# Patient Record
Sex: FEMALE | Race: OTHER | Employment: FULL TIME | URBAN - METROPOLITAN AREA
[De-identification: names, ages, dates, MRNs, and addresses within clinical notes are randomized per-mention and may not be internally consistent; named-entity substitution may affect disease eponyms.]

---

## 2017-01-18 PROBLEM — Z98.890 H/O LEEP: Status: ACTIVE | Noted: 2017-01-18

## 2017-05-09 PROBLEM — R11.0 NAUSEA: Status: ACTIVE | Noted: 2017-05-09

## 2017-05-12 PROBLEM — K80.20 GALLSTONES: Status: ACTIVE | Noted: 2017-05-12

## 2017-07-10 PROBLEM — K80.10 CALCULUS OF GALLBLADDER WITH CHRONIC CHOLECYSTITIS WITHOUT OBSTRUCTION: Status: ACTIVE | Noted: 2017-05-12

## 2017-11-27 PROBLEM — Z90.49 HX OF CHOLECYSTECTOMY: Status: ACTIVE | Noted: 2017-05-12

## 2019-10-07 PROBLEM — Z98.890 H/O DILATION AND CURETTAGE: Status: ACTIVE | Noted: 2019-10-07

## 2021-08-19 PROBLEM — R11.0 NAUSEA: Status: RESOLVED | Noted: 2017-05-09 | Resolved: 2021-08-19

## 2022-03-09 PROBLEM — R63.6 UNDERWEIGHT: Status: ACTIVE | Noted: 2022-03-09

## 2022-11-14 ENCOUNTER — HOSPITAL ENCOUNTER (OUTPATIENT)
Dept: PHYSICAL THERAPY | Age: 37
Discharge: HOME | End: 2022-11-14

## 2022-11-14 PROCEDURE — 9990000029 HC GAP PACKAGE: Performed by: SPECIALIST/TECHNOLOGIST

## 2022-11-14 NOTE — FLOWSHEET NOTE
[facility], [address] [place]  Phone: [phone]  Fax [phone]      Date:  2022    Patient Name:  Katherin Ramos    :  1985  MRN: [id number]  Restrictions/Precautions:    Medical/Treatment Diagnosis Information:   LBP     Physician Information:   none    Patient is Post-Op [] Yes   [x] No     DOS:      Padi $210: 22         Subjective Has had chronic LBP for awhile. 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 Scoliosis L spine.                     Goals Strengthen TA, multifidus,   Decrease back pain                   Reformer Exercises Squats 2R1B 2x10  Walking 2R 2x10  Raises 2R 2x10         Pelvic Stabilization   Bridges 2R1Y with ADD 2x10          Standing ABD 1R1Y 2x10                             Trunk Stabilization 1R 12x          TA series                             Hip Disassociation 2R1Y 15x          arcs                    Flossing 2R         Scapular Stabilization                                        Thoracic Mobility                                        General ROM Hamstring 1 min                                       Other                                        Summary/Comments                            Electronically signed by:  Mamie Alfaro, MS, LAT, ATC

## 2023-01-24 ENCOUNTER — HOSPITAL ENCOUNTER (OUTPATIENT)
Dept: PHYSICAL THERAPY | Age: 38
Discharge: HOME | End: 2023-01-24

## 2023-01-24 NOTE — FLOWSHEET NOTE
[facility], [address] [place]  Phone: [phone]  Fax [phone]      Date:  2023    Patient Name:  Nito Cochran    :  1985  MRN: [id number]  Restrictions/Precautions:    Medical/Treatment Diagnosis Information:   LBP     Physician Information:   none    Patient is Post-Op [] Yes   [x] No     DOS:      Padi $210: 22        Subjective Has had chronic LBP for awhile. 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 Scoliosis L spine.                     Goals Strengthen TA, multifidus,   Decrease back pain                   Reformer Exercises Squats 2R1B 2x10  Walking 2R 2x10  Raises 2R 2x10 25x wide ER  2x10  2x10  Single 2R1Y 2x10        Pelvic Stabilization   Bridges 2R1Y with ADD 2x10 2x10         Standing ABD 1R1Y 2x10 2x10                            Trunk Stabilization 1R 12x 12x         TA series                             Hip Disassociation 2R1Y 15x 15x         arcs                    Flossing 2R         Scapular Stabilization  Seated on box          Rows 1R1B          Ext 1R                  Thoracic Mobility                                        General ROM Hamstring 1 min 1 min          Post capsule 1 min          Hip flexor 1 min 1R                  Other                                        Summary/Comments                            Electronically signed by:  Evie aPz MS, LAT, ATC

## 2023-01-31 ENCOUNTER — HOSPITAL ENCOUNTER (OUTPATIENT)
Dept: PHYSICAL THERAPY | Age: 38
Discharge: HOME | End: 2023-01-31

## 2023-01-31 NOTE — FLOWSHEET NOTE
[facility] [facility], [address] [place]  Phone: [phone]  Fax [phone]      Date:  2023    Patient Name:  Tiffany Silva    :  1985  MRN: [id number]  Restrictions/Precautions:    Medical/Treatment Diagnosis Information:   LBP     Physician Information:   none    Patient is Post-Op [] Yes   [x] No     DOS:      Padi $210: 22       Subjective Has had chronic LBP for awhile. 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 Scoliosis L spine.                     Goals Strengthen TA, multifidus,   Decrease back pain                   Reformer Exercises Squats 2R1B 2x10  Walking 2R 2x10  Raises 2R 2x10 25x wide ER  2x10  2x10  Single 2R1Y 2x10 25x  2x10  12/12  25x       Pelvic Stabilization   Bridges 2R1Y with ADD 2x10 2x10 2x10        Standing ABD 1R1Y 2x10 2x10 2x10                           Trunk Stabilization 1R 12x 12x 12x        TA series            flex                 Hip Disassociation 2R1Y 15x 15x 15x        arcs                    Flossing 2R         Scapular Stabilization  Seated on box          Rows 1R1B 15x         Ext 1R 10x                 Thoracic Mobility                                        General ROM Hamstring 1 min 1 min 1 min         Post capsule 1 min 1 min         Hip flexor 1 min 1R 1 min                 Other   Plank 1 min                                     Summary/Comments                            Electronically signed by:  Amira Medel, MS, LAT, ATC

## 2023-02-07 ENCOUNTER — HOSPITAL ENCOUNTER (OUTPATIENT)
Dept: PHYSICAL THERAPY | Age: 38
Discharge: HOME | End: 2023-02-07

## 2023-02-07 NOTE — FLOWSHEET NOTE
[facility] [facility], [address] [place]  Phone: [phone]  Fax [phone]      Date:  2023    Patient Name:  Naima Sagastume    :  1985  MRN: [id number]  Restrictions/Precautions:    Medical/Treatment Diagnosis Information:   LBP     Physician Information:   none    Patient is Post-Op [] Yes   [x] No     DOS:      Padi $210: 22      Subjective Has had chronic LBP for awhile. 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 Scoliosis L spine.                     Goals Strengthen TA, multifidus,   Decrease back pain                   Reformer Exercises Squats 2R1B 2x10  Walking 2R 2x10  Raises 2R 2x10 25x wide ER  2x10  2x10  Single 2R1Y 2x10 25x  2x10  12/12  25x 25x  2x10  12/12  25x      Pelvic Stabilization   Bridges 2R1Y with ADD 2x10 2x10 2x10 2x10       Standing ABD 1R1Y 2x10 2x10 2x10 2x10                          Trunk Stabilization 1R 12x 12x 12x 15x       TA series   Flex series         flex                 Hip Disassociation 2R1Y 15x 15x 15x 15x       arcs                    Flossing 2R         Scapular Stabilization  Seated on box          Rows 1R1B 15x 15x        Ext 1R 10x 10x                Thoracic Mobility                                        General ROM Hamstring 1 min 1 min 1 min 1 min        Post capsule 1 min 1 min 1 min        Hip flexor 1 min 1R 1 min 1 min                Other   Plank 1 min 1 min                                    Summary/Comments                            Electronically signed by:  Luis Mota, MS, LAT, ATC

## 2023-05-11 ENCOUNTER — HOSPITAL ENCOUNTER (OUTPATIENT)
Dept: PHYSICAL THERAPY | Age: 38
Discharge: HOME | End: 2023-05-11

## 2023-05-11 NOTE — FLOWSHEET NOTE
[facility] [facility], [address] [facility] [place]  Phone: [phone]  Fax [phone]      Date:  2023    Patient Name:  Tonny Hernandez    :  1985  MRN: [id number]  Restrictions/Precautions:    Medical/Treatment Diagnosis Information:   LBP     Physician Information:   none    Patient is Post-Op [] Yes   [x] No     DOS:      Padi $210: 22     Subjective Has had chronic LBP for awhile. Doing well Doing well Doing well \"Back went out\" 3 weeks ago - saw MD and one vertebrae is shifted anteriorly. 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 Scoliosis L spine.                     Goals Strengthen TA, multifidus,   Decrease back pain                   Reformer Exercises Squats 2R1B 2x10  Walking 2R 2x10  Raises 2R 2x10 25x wide ER  2x10  2x10  Single 2R1Y 2x10 25x  2x10  12/12  25x 25x  2x10  12/12  25x 25x 3R  2R1Y  2R1Y  20x     Pelvic Stabilization   Bridges 2R1Y with ADD 2x10 2x10 2x10 2x10 2x10      Standing ABD 1R1Y 2x10 2x10 2x10 2x10 2x10                         Trunk Stabilization 1R 12x 12x 12x 15x 15x      TA series   Flex series No flex        flex                 Hip Disassociation 2R1Y 15x 15x 15x 15x 15x      arcs                    Flossing 2R         Scapular Stabilization  Seated on box          Rows 1R1B 15x 15x 15x       Ext 1R 10x 10x 10x               Thoracic Mobility                                        General ROM Hamstring 1 min 1 min 1 min 1 min 1 min       Post capsule 1 min 1 min 1 min 1 min       Hip flexor 1 min 1R 1 min 1 min 1 min               Other   Plank 1 min 1 min                                    Summary/Comments                            Electronically signed by:  Nida Waddell, MS, LAT, ATC

## 2023-05-19 ENCOUNTER — HOSPITAL ENCOUNTER (OUTPATIENT)
Dept: PHYSICAL THERAPY | Age: 38
Discharge: HOME | End: 2023-05-19

## 2023-05-19 NOTE — FLOWSHEET NOTE
[facility] [facility], [address] [place]  Phone: [phone]  Fax [phone]      Date:  2023    Patient Name:  Atiya Moses    :  1985  MRN: [id number]  Restrictions/Precautions:    Medical/Treatment Diagnosis Information:   LBP     Physician Information:   none    Patient is Post-Op [] Yes   [x] No     DOS:      Padi $210: 22    Subjective Has had chronic LBP for awhile. Doing well Doing well Doing well \"Back went out\" 3 weeks ago - saw MD and one vertebrae is shifted anteriorly. 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 Scoliosis L spine.                     Goals Strengthen TA, multifidus,   Decrease back pain                   Reformer Exercises Squats 2R1B 2x10  Walking 2R 2x10  Raises 2R 2x10 25x wide ER  2x10  2x10  Single 2R1Y 2x10 25x  2x10  12/12  25x 25x  2x10  12/12  25x 25x 3R  2R1Y  2R1Y  20x 25x  2x10  2x10  20x    Pelvic Stabilization   Bridges 2R1Y with ADD 2x10 2x10 2x10 2x10 2x10 2x10     Standing ABD 1R1Y 2x10 2x10 2x10 2x10 2x10 2x10                        Trunk Stabilization 1R 12x 12x 12x 15x 15x 15x     TA series   Flex series No flex Attempted flex - continue to wait       flex                 Hip Disassociation 2R1Y 15x 15x 15x 15x 15x 15x     arcs                    Flossing 2R         Scapular Stabilization  Seated on box          Rows 1R1B 15x 15x 15x 15x      Ext 1R 10x 10x 10x 10x              Thoracic Mobility                                        General ROM Hamstring 1 min 1 min 1 min 1 min 1 min 1 min      Post capsule 1 min 1 min 1 min 1 min 1 min      Hip flexor 1 min 1R 1 min 1 min 1 min 1 min              Other   Plank 1 min 1 min                                    Summary/Comments

## 2023-06-01 ENCOUNTER — HOSPITAL ENCOUNTER (OUTPATIENT)
Dept: PHYSICAL THERAPY | Age: 38
Discharge: HOME | End: 2023-06-01

## 2023-06-01 NOTE — FLOWSHEET NOTE
[facility], [address] [facility] [place]  Phone: [phone]  Fax [phone]      Date:  2023    Patient Name:  Danette Rios    :  1985  MRN: [id number]  Restrictions/Precautions:    Medical/Treatment Diagnosis Information:   LBP     Physician Information:   none    Patient is Post-Op [] Yes   [x] No     DOS:      Padi $210: 22   Subjective Has had chronic LBP for awhile. Doing well Doing well Doing well \"Back went out\" 3 weeks ago - saw MD and one vertebrae is shifted anteriorly. MD recommended TA strengthening Doing well Doing ok - back has been sore from being on feet and toddler wants to be held more. 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 Scoliosis L spine.                     Goals Strengthen TA, multifidus,   Decrease back pain                   Reformer Exercises Squats 2R1B 2x10  Walking 2R 2x10  Raises 2R 2x10 25x wide ER  2x10  2x10  Single 2R1Y 2x10 25x  2x10  12/12  25x 25x  2x10  12/12  25x 25x 3R  2R1Y  2R1Y  20x 25x  2x10  2x10  20x 25x  2x10  2x10  20x   Pelvic Stabilization   Bridges 2R1Y with ADD 2x10 2x10 2x10 2x10 2x10 2x10 2x10    Standing ABD 1R1Y 2x10 2x10 2x10 2x10 2x10 2x10 2x10                       Trunk Stabilization 1R 12x 12x 12x 15x 15x 15x 15x    TA series   Flex series No flex Attempted flex - continue to wait       flex                 Hip Disassociation 2R1Y 15x 15x 15x 15x 15x 15x 15x    arcs                    Flossing 2R         Scapular Stabilization  Seated on box          Rows 1R1B 15x 15x 15x 15x 15x     Ext 1R 10x 10x 10x 10x 10x             Thoracic Mobility                                        General ROM Hamstring 1 min 1 min 1 min 1 min 1 min 1 min 1 min     Post capsule 1 min 1 min 1 min 1 min 1 min 1 min     Hip

## 2023-07-18 ENCOUNTER — HOSPITAL ENCOUNTER (OUTPATIENT)
Dept: PHYSICAL THERAPY | Age: 38
Setting detail: THERAPIES SERIES
Discharge: HOME | End: 2023-07-18
Payer: COMMERCIAL

## 2023-07-18 PROCEDURE — 97161 PT EVAL LOW COMPLEX 20 MIN: CPT

## 2023-07-18 PROCEDURE — 97110 THERAPEUTIC EXERCISES: CPT

## 2023-07-18 PROCEDURE — 97530 THERAPEUTIC ACTIVITIES: CPT

## 2023-07-18 NOTE — FLOWSHEET NOTE
[facility], [facility]  [address] [facility], [address] [facility]  Phone: [phone]   Fax:     [phone]    Physical Therapy Treatment Note/ Progress Report:     Date:  2023    Patient Name:  Savanah Deshpande    :  1985  MRN: [id number]    Pertinent Medical History: Additional Pertinent Hx: Bipolar 1 disorder    Medical/Treatment Diagnosis Information:  Medical Diagnosis: Radiculopathy, lumbar region [M54.16]  Treatment Diagnosis: back pain, dec core and hip strength, dec ROM limiting ADLs    Insurance/Certification information:  PT Insurance Information: [employer] - 36 combined vpcy; no auth  Physician Information:  Qasim Taylor MD  Plan of care signed (Y/N): faxed at eval     Date of Patient follow up with Physician:      Progress Report: []  Yes  [x]  No     Date Range for reporting period:  Beginnin2023  Ending:    Progress report due (10 Rx/or 30 days whichever is less): 02     Recertification due (POC duration/ or 90 days whichever is less):     Visit # POC/ Insurance Allowable Auth Needed    40 []Yes    [x]No     Functional Outcomes Measure:     Test: Modified Oswestry   Date Assessed Score    6           Pain level:  4/10 ( up to 8/10 if back \"goes out\" like it does a few times a yr)     History of Injury:  Pt with 10+ yr h/o back pain resulting from gymnastics injury as a child. Pain was intermittent but has increased since this past April when it \"when out\". She has a 2 yr old child. Only a few episodes of R LE pain and NT but this doesn't happen regularly. Pt doesn't really do any hep consistently. Sitting is better than standing; stiffness in the morning but pain levels by end of day depends on activity levels. Xrays:   L5-S1 isthmic spondylolisthesis.        SUBJECTIVE:  See eval    OBJECTIVE:   Observation:   Test measurements:      RESTRICTIONS/PRECAUTIONS:

## 2023-07-18 NOTE — PLAN OF CARE
ROM, for LE, Glutes and core   [x]  NMR activation and proprioception for glutes , LE and Core   [x]  Manual therapy as indicated for Hip complex, LE and spine to include: Dry Needling/IASTM, STM, PROM, Gr I-IV mobilizations, manipulation. [x]  Modalities as needed that may include: thermal agents, E-stim, Biofeedback, US, iontophoresis as indicated  [x]  Patient education on joint protection, postural re-education, activity modification, progression of HEP. [x]  Aquatic exercise including: strength training, ROM and balance for LE, Glutes and core     HEP instruction: see flowsheet     Access Code: [email or web address]  URL: [email or web address]  Date: 07/18/2023  Prepared by: Abram Portillo    Exercises  - Supine Double Knee to Chest  - 1-2 x daily - 7 x weekly - 1 sets - 3 reps - 20 sec  hold  - Supine Piriformis Stretch with Foot on Ground  - 1-2 x daily - 7 x weekly - 1 sets - 3 reps - 20 sec hold  - Supine Posterior Pelvic Tilt  - 1-2 x daily - 7 x weekly - 1 sets - 10 reps - 5 sec  hold  - Supine Transversus Abdominis Bracing - Hands on Ground  - 1-2 x daily - 7 x weekly - 1 sets - 10 reps - 5 sec  hold    GOALS:  Patient stated goal: \"core strength\"  [] Progressing: [] Met: [] Not Met: [] Adjusted  Therapist goals for Patient:   Short Term Goals: To be achieved in: 2 weeks  1. Independent in HEP and progression per patient tolerance, in order to prevent re-injury. [] Progressing: [] Met: [] Not Met: [] Adjusted  2. Patient will have a decrease in pain to facilitate improvement in movement, function, and ADLs as indicated by Functional Deficits. [] Progressing: [] Met: [] Not Met: [] Adjusted    Long Term Goals: To be achieved in: at DC  1. Decrease Modified Oswestry functional outcome score from 6 to 3 to assist with reaching prior level of function. [] Progressing: [] Met: [] Not Met: [] Adjusted  2.  Patient will demonstrate increased AROM to WNL, good LS mobility, good hip ROM to allow for proper joint

## 2023-07-20 ENCOUNTER — APPOINTMENT (OUTPATIENT)
Dept: PHYSICAL THERAPY | Age: 38
End: 2023-07-20
Payer: COMMERCIAL

## 2023-07-25 ENCOUNTER — HOSPITAL ENCOUNTER (OUTPATIENT)
Dept: PHYSICAL THERAPY | Age: 38
Setting detail: THERAPIES SERIES
Discharge: HOME | End: 2023-07-25
Payer: COMMERCIAL

## 2023-07-25 PROCEDURE — 97110 THERAPEUTIC EXERCISES: CPT

## 2023-07-25 PROCEDURE — 97530 THERAPEUTIC ACTIVITIES: CPT

## 2023-07-25 NOTE — FLOWSHEET NOTE
functioning as indicated by patients Functional Deficits. [] Progressing: [] Met: [] Not Met: [] Adjusted  3. Patient will demonstrate an increase in Strength to good proximal hip and core activation to allow for proper functional mobility as indicated by patients Functional Deficits. [] Progressing: [] Met: [] Not Met: [] Adjusted  4. 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 [] Progressing: [] Met: [] Not Met: [] Adjusted  5. \"Normal function with less pain\"(patient specific functional goal)    [] Progressing: [] Met: [] Not Met: [] Adjusted            ASSESSMENT:  7/18: skilled therapy for core stabilization program to allow pt to tolerate normal ADLS such as work and  without pain    7/25: cues for posture and TA with all ex, seemed to sacha well without inc c/o     Treatment/Activity Tolerance:  [x] Patient tolerated treatment well [] Patient limited by fatique  [] Patient limited by pain  [] Patient limited by other medical complications  [] Other:     Overall Progression Towards Functional goals/ Treatment Progress Update:  [] Patient is progressing as expected towards functional goals listed. [] Progression is slowed due to complexities/Impairments listed. [] Progression has been slowed due to co-morbidities.   [x] Plan just implemented, too soon to assess goals progression <30days   [] Goals require adjustment due to lack of progress  [] Patient is not progressing as expected and requires additional follow up with physician  [] Other:    Prognosis for POC: [x] Good [] Fair  [] Poor    Patient requires continued skilled intervention: [x] Yes  [] No        PLAN: Add as tolerated   [x] Continue per plan of care [] Alter current plan (see comments)  [] Plan of care initiated [] Hold pending MD visit [] Discharge    Electronically signed by: Fili Gibson, PTMPT [id number]    Note: If patient does not return for scheduled/recommended follow

## 2023-07-27 ENCOUNTER — HOSPITAL ENCOUNTER (OUTPATIENT)
Dept: PHYSICAL THERAPY | Age: 38
Setting detail: THERAPIES SERIES
Discharge: HOME | End: 2023-07-27
Payer: COMMERCIAL

## 2023-07-27 PROCEDURE — 97110 THERAPEUTIC EXERCISES: CPT

## 2023-07-27 NOTE — FLOWSHEET NOTE
today; sacha last session well          OBJECTIVE:   Observation:   Test measurements:      RESTRICTIONS/PRECAUTIONS:     Exercises/Interventions:     Therapeutic Ex (59390)   Min:22 Resistance/Reps Notes/Cues   NuStep Seat 4 L2 x 5 min  See hep listed below     TB    Side step w/ TA   Paloff press w/ TA Grn  2 steps x 3 reps L/R  X 10 presses L/R    Swiss ball w/ TA   Sit opp UE/LE   Walk out into prtl bridge  Grn ball  X 10   X 3    Cues for control  SBA/CGA   Plyoball w/ TA   Amb cw/ccw   Overhead lift w/ step up   Diag lift  2#  Length of gym x 2   6\" x 10   X 5 L/R      Wall squat with TA 10 x 5 sec  Add lift overhead/plyoball        Bridge w/ LE hip abd/add w/ versa loop  Grn  X 10     Abdominal crunch   Diag crunch L1 x 10   add         Quad UE            LE Alternating x 5 each  Same leg L/R x 5 each    Progress to alternating LEs/bird dog   Plank  On toes 2 x 15 sec     Side plank           Therapeutic Activity (93574) Min: 5    Body mech edu w/ crate lifting and TA Floor - pivot to table x 5  Table to top of linen cart x 5                   NMR re-education (41798)   Min:3     TA with    LE march   Lower abd 90/90 hold   X 10   3 x 15 sec      Progress to bicycle legs/dead bug                  Manual Intervention (67279) Min:                Modalities  Min:             Other Therapeutic Activities:  Pt was educated on PT POC, Diagnosis, Prognosis, pathomechanics as well as frequency and duration of scheduling future physical therapy appointments. Time was also taken on this day to answer all patient questions and participation in PT. Reviewed appointment policy in detail with patient and patient verbalized understanding. Home Exercise Program: Patient instructed in the following for HEP: . Patient verbalized/demonstrated understanding and was issued written handout. Access Code: [email or web address]  URL: [email or web address]  Date: 07/18/2023  Prepared by: Sunny Abad     Exercises  - Supine Double

## 2023-08-01 ENCOUNTER — HOSPITAL ENCOUNTER (OUTPATIENT)
Dept: PHYSICAL THERAPY | Age: 38
Setting detail: THERAPIES SERIES
Discharge: HOME | End: 2023-08-01
Payer: COMMERCIAL

## 2023-08-01 PROCEDURE — 97110 THERAPEUTIC EXERCISES: CPT

## 2023-08-01 NOTE — FLOWSHEET NOTE
[address] [facility] Therapy, [facility]  [address] [place], [address] [facility]  Phone: [phone]   Fax:     [phone]    Physical Therapy Treatment Note/ Progress Report:     Date:  2023    Patient Name:  Savanah Deshpande    :  1985  MRN: [id number]    Pertinent Medical History: Additional Pertinent Hx: Bipolar 1 disorder    Medical/Treatment Diagnosis Information:  Medical Diagnosis: Radiculopathy, lumbar region [M54.16]  Treatment Diagnosis: back pain, dec core and hip strength, dec ROM limiting ADLs    Insurance/Certification information:  PT Insurance Information: [employer] - 36 combined vpcy; no auth  Physician Information:  Severo Locke, MD  Plan of care signed (Y/N): faxed at eval     Date of Patient follow up with Physician:      Progress Report: []  Yes  [x]  No     Date Range for reporting period:  Beginnin2023  Ending:    Progress report due (10 Rx/or 30 days whichever is less): 3/89/19     Recertification due (POC duration/ or 90 days whichever is less):     Visit # POC/ Insurance Allowable Auth Needed    40 []Yes    [x]No     Functional Outcomes Measure:     Test: Modified Oswestry   Date Assessed Score    6           Pain level:  1-2/10 ( up to 8/10 if back \"goes out\" like it does a few times a yr)     History of Injury:  Pt with 10+ yr h/o back pain resulting from gymnastics injury as a child. Pain was intermittent but has increased since this past April when it \"when out\". She has a 2 yr old child. Only a few episodes of R LE pain and NT but this doesn't happen regularly. Pt doesn't really do any hep consistently. Sitting is better than standing; stiffness in the morning but pain levels by end of day depends on activity levels. Xrays:   L5-S1 isthmic spondylolisthesis.        SUBJECTIVE:  See eval  : soreness central back; sacha hep well  : just annoying stiffness in low back

## 2023-08-08 ENCOUNTER — HOSPITAL ENCOUNTER (OUTPATIENT)
Dept: PHYSICAL THERAPY | Age: 38
Setting detail: THERAPIES SERIES
Discharge: HOME | End: 2023-08-08
Payer: COMMERCIAL

## 2023-08-08 PROCEDURE — 97110 THERAPEUTIC EXERCISES: CPT

## 2023-08-08 PROCEDURE — 97530 THERAPEUTIC ACTIVITIES: CPT

## 2023-08-08 NOTE — FLOWSHEET NOTE
[facility] and Therapy, [facility]  [address] [facility], [address] [facility]  Phone: [phone]   Fax:     [phone]    Physical Therapy Treatment Note/ Progress Report:     Date:  2023    Patient Name:  Savanah Deshpande    :  1985  MRN: [id number]    Pertinent Medical History: Additional Pertinent Hx: Bipolar 1 disorder    Medical/Treatment Diagnosis Information:  Medical Diagnosis: Radiculopathy, lumbar region [M54.16]  Treatment Diagnosis: back pain, dec core and hip strength, dec ROM limiting ADLs    Insurance/Certification information:  PT Insurance Information: [employer] - 36 combined vpcy; no auth  Physician Information:  Payton Londono MD  Plan of care signed (Y/N): faxed at eval     Date of Patient follow up with Physician:      Progress Report: [x]  Yes -   []  No     Date Range for reporting period:  Beginnin2023  Ending:    Progress report due (10 Rx/or 30 days whichever is less):      Recertification due (POC duration/ or 90 days whichever is less):     Visit # POC/ Insurance Allowable Auth Needed    40 []Yes    [x]No     Functional Outcomes Measure:     Test: Modified Oswestry   Date Assessed Score    6           Pain level:  1-2/10 ( up to 8/10 if back \"goes out\" like it does a few times a yr)     History of Injury:  Pt with 10+ yr h/o back pain resulting from gymnastics injury as a child. Pain was intermittent but has increased since this past April when it \"when out\". She has a 2 yr old child. Only a few episodes of R LE pain and NT but this doesn't happen regularly. Pt doesn't really do any hep consistently. Sitting is better than standing; stiffness in the morning but pain levels by end of day depends on activity levels. Xrays:   L5-S1 isthmic spondylolisthesis.        SUBJECTIVE:  See eval  : soreness central back; sacha hep well  : just annoying stiffness in

## 2023-08-10 ENCOUNTER — HOSPITAL ENCOUNTER (OUTPATIENT)
Dept: PHYSICAL THERAPY | Age: 38
Setting detail: THERAPIES SERIES
End: 2023-08-10
Payer: COMMERCIAL

## 2023-08-15 ENCOUNTER — HOSPITAL ENCOUNTER (OUTPATIENT)
Dept: PHYSICAL THERAPY | Age: 38
Setting detail: THERAPIES SERIES
Discharge: HOME | End: 2023-08-15
Payer: COMMERCIAL

## 2023-08-15 PROCEDURE — 97530 THERAPEUTIC ACTIVITIES: CPT

## 2023-08-15 PROCEDURE — 97110 THERAPEUTIC EXERCISES: CPT

## 2023-08-15 NOTE — FLOWSHEET NOTE
[address] [facility] and Therapy, [facility]  [address] [facility], [address]  Phone: [phone]   Fax:     [phone]    Physical Therapy Treatment Note/ Progress Report:     Date:  08/15/2023    Patient Name:  Savanah Deshpande    :  1985  MRN: [id number]    Pertinent Medical History: Additional Pertinent Hx: Bipolar 1 disorder    Medical/Treatment Diagnosis Information:  Medical Diagnosis: Radiculopathy, lumbar region [M54.16]  Treatment Diagnosis: back pain, dec core and hip strength, dec ROM limiting ADLs    Insurance/Certification information:  PT Insurance Information: [employer] - 36 combined vpcy; no auth  Physician Information:  Molli Sandhoff, MD  Plan of care signed (Y/N): faxed at eval     Date of Patient follow up with Physician:      Progress Report: []  Yes -   [x]  No     Date Range for reporting period:  Beginnin2023  Ending:    Progress report due (10 Rx/or 30 days whichever is less):      Recertification due (POC duration/ or 90 days whichever is less):     Visit # POC/ Insurance Allowable Auth Needed    40 []Yes    [x]No     Functional Outcomes Measure:     Test: Modified Oswestry   Date Assessed Score    6           Pain level:  1-2/10 ( up to 8/10 if back \"goes out\" like it does a few times a yr)     History of Injury:  Pt with 10+ yr h/o back pain resulting from gymnastics injury as a child. Pain was intermittent but has increased since this past April when it \"when out\". She has a 2 yr old child. Only a few episodes of R LE pain and NT but this doesn't happen regularly. Pt doesn't really do any hep consistently. Sitting is better than standing; stiffness in the morning but pain levels by end of day depends on activity levels. Xrays:   L5-S1 isthmic spondylolisthesis.        SUBJECTIVE:  See eval  : soreness central back; sacha hep well  : just annoying stiffness in

## 2023-08-17 ENCOUNTER — HOSPITAL ENCOUNTER (OUTPATIENT)
Dept: PHYSICAL THERAPY | Age: 38
Setting detail: THERAPIES SERIES
Discharge: HOME | End: 2023-08-17
Payer: COMMERCIAL

## 2023-08-17 PROCEDURE — 97110 THERAPEUTIC EXERCISES: CPT

## 2023-08-17 PROCEDURE — 97530 THERAPEUTIC ACTIVITIES: CPT

## 2023-08-17 NOTE — FLOWSHEET NOTE
[address] [facility] and Therapy, [facility]  [address] [facility], [address]  Phone: [phone]   Fax:     [phone]    Physical Therapy Treatment Note/ Progress Report:     Date:  2023    Patient Name:  Savanah Deshpande    :  1985  MRN: [id number]    Pertinent Medical History: Additional Pertinent Hx: Bipolar 1 disorder    Medical/Treatment Diagnosis Information:  Medical Diagnosis: Radiculopathy, lumbar region [M54.16]  Treatment Diagnosis: back pain, dec core and hip strength, dec ROM limiting ADLs    Insurance/Certification information:  PT Insurance Information: [employer] - 36 combined vpcy; no auth  Physician Information:  Rossy Howard MD  Plan of care signed (Y/N): faxed at eval     Date of Patient follow up with Physician:      Progress Report: []  Yes -   [x]  No     Date Range for reporting period:  Beginnin2023  Ending:    Progress report due (10 Rx/or 30 days whichever is less):      Recertification due (POC duration/ or 90 days whichever is less):     Visit # POC/ Insurance Allowable Auth Needed    40 []Yes    [x]No     Functional Outcomes Measure:     Test: Modified Oswestry   Date Assessed Score    6           Pain level:  1-2/10 ( up to 8/10 if back \"goes out\" like it does a few times a yr)     History of Injury:  Pt with 10+ yr h/o back pain resulting from gymnastics injury as a child. Pain was intermittent but has increased since this past April when it \"when out\". She has a 2 yr old child. Only a few episodes of R LE pain and NT but this doesn't happen regularly. Pt doesn't really do any hep consistently. Sitting is better than standing; stiffness in the morning but pain levels by end of day depends on activity levels. Xrays:   L5-S1 isthmic spondylolisthesis.        SUBJECTIVE:  See eval  : soreness central back; sacha hep well  : just annoying stiffness in

## 2023-09-05 ENCOUNTER — HOSPITAL ENCOUNTER (OUTPATIENT)
Dept: PHYSICAL THERAPY | Age: 38
Setting detail: THERAPIES SERIES
Discharge: HOME | End: 2023-09-05
Payer: COMMERCIAL

## 2023-09-05 PROCEDURE — 97530 THERAPEUTIC ACTIVITIES: CPT

## 2023-09-05 PROCEDURE — 97110 THERAPEUTIC EXERCISES: CPT
